# Patient Record
Sex: MALE | URBAN - METROPOLITAN AREA
[De-identification: names, ages, dates, MRNs, and addresses within clinical notes are randomized per-mention and may not be internally consistent; named-entity substitution may affect disease eponyms.]

---

## 2023-04-12 ENCOUNTER — OFFICE VISIT (OUTPATIENT)
Facility: LOCATION | Age: 68
End: 2023-04-12
Payer: MEDICARE

## 2023-04-12 DIAGNOSIS — Z96.1 PRESENCE OF INTRAOCULAR LENS: Primary | ICD-10-CM

## 2023-04-12 DIAGNOSIS — H25.812: ICD-10-CM

## 2023-04-12 PROCEDURE — 99213 OFFICE O/P EST LOW 20 MIN: CPT | Performed by: OPTOMETRIST

## 2023-04-12 ASSESSMENT — INTRAOCULAR PRESSURE
OS: 15
OD: 14

## 2023-04-12 NOTE — IMPRESSION/PLAN
Impression: Examination revealed cataract. Clinically significant Today:
1+ NS OS Plan: No surgery indicated at this time. RTC in 1 year or sooner if any sudden VA changes occur.

## 2023-04-12 NOTE — IMPRESSION/PLAN
Impression: s/p CEIOL surgery doing well. OD: 07/26/22 PCIOL centered and clear. (-) Siedel sign Patient is pseudophakic
1 year for evaluation of pseudophakia. Patient doing well. Plan: Patient released for glasses Rx when patient is able. RTC in 12 months for annual exam.
RTC PRN decrease VA, increase pain, redness, discharge, photophobia, flashes/floaters, or other vision problems.

## 2024-04-10 ENCOUNTER — OFFICE VISIT (OUTPATIENT)
Facility: LOCATION | Age: 69
End: 2024-04-10
Payer: MEDICARE

## 2024-04-10 DIAGNOSIS — H25.812: Primary | ICD-10-CM

## 2024-04-10 PROCEDURE — 99214 OFFICE O/P EST MOD 30 MIN: CPT | Performed by: OPTOMETRIST

## 2024-04-10 ASSESSMENT — INTRAOCULAR PRESSURE
OD: 13
OS: 13